# Patient Record
Sex: MALE | Race: WHITE
[De-identification: names, ages, dates, MRNs, and addresses within clinical notes are randomized per-mention and may not be internally consistent; named-entity substitution may affect disease eponyms.]

---

## 2020-01-29 ENCOUNTER — HOSPITAL ENCOUNTER (EMERGENCY)
Dept: HOSPITAL 95 - ER | Age: 55
Discharge: LEFT BEFORE DISCHARGE | End: 2020-01-29
Payer: COMMERCIAL

## 2020-01-29 VITALS — BODY MASS INDEX: 25.73 KG/M2 | HEIGHT: 72 IN | WEIGHT: 189.99 LBS

## 2020-01-29 DIAGNOSIS — Z53.21: Primary | ICD-10-CM

## 2024-10-22 NOTE — NUR
PT ARRIVED TO THE ROOM FROM PACU AT APPROXIMATELY 1450.  PT ALERT, ORIENTED
AND PAIN MANAGED.  SPINAL SITE WNL.  VSS. PT EDUCATED TO USE CALL LIGHT, CALL
LIGHT PLACED WITHIN REACH.

## 2024-10-22 NOTE — NUR
10/22/24 1314 Salma Blackmon
BRUISE NOTED ON THE BACK OF THE PATIENT'S KNEE PRIOR TO ENTRY INTO THE
OR.

## 2024-10-22 NOTE — NUR
History, Chart, Medications and Allergies reviewed before start of procedure.
Patient up to Ambulate independently. Gait steady.  Pre-Op teaching done. Pt
verbalizes understanding.  Patient confirms NPO status and agrees with
scheduled surgery.  Patient reports completing Chlorhexadine shower X2 prior
to admission to hospital. Surgical site prepped with 2% Chlorhexidine cloth
wipe.  Lungs clear T/O to Auscultation.  Patient States Post-Procedure ride
home has been arranged.

## 2024-10-22 NOTE — NUR
SHIFT SUMMARY
PT IS POD#0 FROM R TKA.  PT HAD A SPINAL ANESTHESIA, MOVEMENT AND SENSATION TO
BLE ARE IMPROVING BUT PT STILL HAS SOME DECREASED SENSATION AND CURRENTLY
DENIES PAIN.  WAITING FOR PT TO VOID, PT DENIES THE URGE.  PT SALINE LOCKED.
VSS.  PT USES CALL LIGHT APPROPRIATELY.

## 2024-10-23 NOTE — NUR
SHIFT SUMMARY NOC.
PT POD 1 FOR RIGHT TOTAL KNEE. PT AQUACEL AND ACE WRAP C/D/I, WITH POLAR PACK
IN PLACE. PT VOIDING URINE AND TOLERATING PO INTAKE. PAIN MANAGED WITH OXY
5MG, TYLENOL, AND TORADOL. PT AMBULATED TO  WITH SBA, FWW, AND GAIT BELT.
BED IN LOWEST POSITION, CALL LIGHT IN REACH.

## 2024-10-23 NOTE — NUR
DISCHARGE NOTE
POD 1 RTKA. INCISION SITE COVERED WITH ACE AND BANDAGE C/D/I.
EATING/DRINKING/VOIDING WITHOUT DIFFICULTY. WORKED WITH PT THIS MORNING. VSS.
PPP. DENIES N/T. DENIES N/V. VERBALIZED UNDERSTANDING OF DISCHARGE
INSTRUCTIONS. SENT HOME WITH ADDTIONAL BANDAGES.  WHEELED OUT TO WIFES CAR VIA
WHEELCHAIR.